# Patient Record
Sex: MALE | Race: WHITE | NOT HISPANIC OR LATINO | Employment: FULL TIME | URBAN - METROPOLITAN AREA
[De-identification: names, ages, dates, MRNs, and addresses within clinical notes are randomized per-mention and may not be internally consistent; named-entity substitution may affect disease eponyms.]

---

## 2022-08-15 ENCOUNTER — HOSPITAL ENCOUNTER (EMERGENCY)
Facility: HOSPITAL | Age: 37
Discharge: HOME | End: 2022-08-15
Attending: EMERGENCY MEDICINE | Admitting: EMERGENCY MEDICINE

## 2022-08-15 ENCOUNTER — APPOINTMENT (OUTPATIENT)
Dept: GENERAL RADIOLOGY | Facility: HOSPITAL | Age: 37
End: 2022-08-15

## 2022-08-15 VITALS
TEMPERATURE: 97.3 F | SYSTOLIC BLOOD PRESSURE: 115 MMHG | OXYGEN SATURATION: 98 % | DIASTOLIC BLOOD PRESSURE: 69 MMHG | RESPIRATION RATE: 20 BRPM | HEIGHT: 72 IN | WEIGHT: 160 LBS | HEART RATE: 70 BPM | BODY MASS INDEX: 21.67 KG/M2

## 2022-08-15 DIAGNOSIS — S33.5XXA LUMBAR SPRAIN, INITIAL ENCOUNTER: ICD-10-CM

## 2022-08-15 DIAGNOSIS — S23.9XXA THORACIC SPRAIN: Primary | ICD-10-CM

## 2022-08-15 PROCEDURE — 72072 X-RAY EXAM THORAC SPINE 3VWS: CPT

## 2022-08-15 PROCEDURE — 99283 EMERGENCY DEPT VISIT LOW MDM: CPT

## 2022-08-15 PROCEDURE — 25010000002 KETOROLAC TROMETHAMINE PER 15 MG: Performed by: EMERGENCY MEDICINE

## 2022-08-15 PROCEDURE — 71046 X-RAY EXAM CHEST 2 VIEWS: CPT

## 2022-08-15 PROCEDURE — 96372 THER/PROPH/DIAG INJ SC/IM: CPT

## 2022-08-15 PROCEDURE — 72110 X-RAY EXAM L-2 SPINE 4/>VWS: CPT

## 2022-08-15 RX ORDER — HYDROCODONE BITARTRATE AND ACETAMINOPHEN 7.5; 325 MG/1; MG/1
1 TABLET ORAL ONCE
Status: COMPLETED | OUTPATIENT
Start: 2022-08-15 | End: 2022-08-15

## 2022-08-15 RX ORDER — MELOXICAM 15 MG/1
15 TABLET ORAL DAILY
Qty: 30 TABLET | Refills: 0 | Status: SHIPPED | OUTPATIENT
Start: 2022-08-15

## 2022-08-15 RX ORDER — KETOROLAC TROMETHAMINE 30 MG/ML
30 INJECTION, SOLUTION INTRAMUSCULAR; INTRAVENOUS ONCE
Status: COMPLETED | OUTPATIENT
Start: 2022-08-15 | End: 2022-08-15

## 2022-08-15 RX ORDER — METHOCARBAMOL 500 MG/1
500 TABLET, FILM COATED ORAL 4 TIMES DAILY
Qty: 30 TABLET | Refills: 0 | Status: SHIPPED | OUTPATIENT
Start: 2022-08-15

## 2022-08-15 RX ADMIN — HYDROCODONE BITARTRATE AND ACETAMINOPHEN 1 TABLET: 7.5; 325 TABLET ORAL at 12:17

## 2022-08-15 RX ADMIN — KETOROLAC TROMETHAMINE 30 MG: 30 INJECTION, SOLUTION INTRAMUSCULAR at 12:17

## 2022-08-15 NOTE — ED NOTES
Pts family member in room reports pt had 2 incidents today while he was at work that he experience back pain. Pt reported to family member first instance was when he was moving a cabinet across a conveyor belt, and pt states he has a sharp sudden pain stab through his middle back. Pts family reports the second time was when he bent down to  a bundle of logs, and pt dropped down to his knees due to the pain that went through his middle back Pt now complains of pain being a constant pain and describes it has a crushing feeling in his spine and burning thorough his muscles. Pt states pain stay in back, and does not radiate. Pt states it hurts to take a deep breath because the pain in his back increases. Pts family member states pt has a high pain tolerance.

## 2022-08-15 NOTE — ED PROVIDER NOTES
"Subjective   Chief complaint: Patient is a pleasant 37-year-old.  He is deaf but does read lips and he has family member interpreting.  He declines .  He was at work today.  He was moving some wood.  He felt a \"pop\" in his mid back spreading diffusely.  He then later tried to lift a heavy wood and the pain hit him so hard it brought him to his knees.  He has pain worse with movement.  Pain is worse with breathing in.  He has never had a blood clot.  No swelling or pain in his legs.  Not coughing up any blood.  No history of cancer.  No recent long trip or hospitalization or surgery.  This pain happened while mechanically exerting his body.  And he presented here for evaluation.  He has never had problems with his back before.    Context: As above.  He describes a mechanical injury.    Duration: Happened few hours ago this morning at work    Timing: Sudden onset    Severity: 8 out of 10    Associated Symptoms: As noted above.  Leading up to this felt fine.  No fever.  No numbness.  No weakness.  No difficulty urinating or voiding.        PCP:  LMP:          Review of Systems   Constitutional: Negative.    HENT: Negative.    Eyes: Negative.    Respiratory: Negative.    Cardiovascular: Negative.    Gastrointestinal: Negative.    Genitourinary: Negative.    Musculoskeletal: Positive for back pain.   Skin: Negative.    Neurological: Negative for weakness and numbness.   Psychiatric/Behavioral: Negative.        No past medical history on file.    No Known Allergies    No past surgical history on file.    No family history on file.    Social History     Socioeconomic History   • Marital status: Single           Objective   Physical Exam  Vitals and nursing note reviewed.   Constitutional:       Appearance: Normal appearance.   HENT:      Head: Normocephalic and atraumatic.   Eyes:      Extraocular Movements: Extraocular movements intact.      Pupils: Pupils are equal, round, and reactive to light. "   Cardiovascular:      Rate and Rhythm: Normal rate and regular rhythm.      Pulses: Normal pulses.      Heart sounds: Normal heart sounds.   Pulmonary:      Effort: Pulmonary effort is normal.      Breath sounds: Normal breath sounds.   Abdominal:      Tenderness: There is no abdominal tenderness.   Musculoskeletal:      Cervical back: Normal range of motion and neck supple.      Thoracic back: Tenderness present.      Lumbar back: Tenderness present. Negative right straight leg raise test and negative left straight leg raise test.        Back:    Neurological:      General: No focal deficit present.      Mental Status: He is alert and oriented to person, place, and time.      Cranial Nerves: No cranial nerve deficit.      Sensory: No sensory deficit.      Motor: No weakness.      Coordination: Coordination normal.   Psychiatric:         Mood and Affect: Mood normal.         Behavior: Behavior normal.         Thought Content: Thought content normal.         Judgment: Judgment normal.         Procedures           ED Course          XR Chest 2 View    Result Date: 8/15/2022  No acute cardiopulmonary abnormality.  Electronically Signed By-Nii Fisher MD On:8/15/2022 12:54 PM This report was finalized on [id number] by  Nii Fisher MD.    XR Spine Thoracic 3 View    Result Date: 8/15/2022  No acute fracture or dislocation of the thoracic spine.  Electronically Signed By-Nii Fisher MD On:8/15/2022 12:55 PM This report was finalized on [id number] by  Nii Fisher MD.    XR Spine Lumbar Complete 4+VW    Result Date: 8/15/2022   1. No acute fracture or dislocation.  Electronically Signed By-Nii Fisher MD On:8/15/2022 12:54 PM This report was finalized on [id number] by  Nii Fisher MD.                                      MDM  Number of Diagnoses or Management Options  Diagnosis management comments: Patient's job entails lifting as part of his normal activity.  Heavy lifting.  He did have  neurologically intact exam.  However with his persistent and significant pain we will place him off work for the next couple of days and he will follow-up with work health for further restrictions or treatment.  Patient and significant other verbalized understanding.  At this point time will discharge to follow-up outpatient.  They have worked health set up already.  They have been seen today and to be reevaluated Friday.  I did review radiologic studies.       Amount and/or Complexity of Data Reviewed  Tests in the radiology section of CPT®: reviewed  Independent visualization of images, tracings, or specimens: yes    Patient Progress  Patient progress: stable      Final diagnoses:   None   Acute thoracolumbar sprain    ED Disposition  ED Disposition     None          No follow-up provider specified.       Medication List      No changes were made to your prescriptions during this visit.          David Portillo,   08/15/22 1400